# Patient Record
Sex: MALE | Race: BLACK OR AFRICAN AMERICAN | NOT HISPANIC OR LATINO | URBAN - METROPOLITAN AREA
[De-identification: names, ages, dates, MRNs, and addresses within clinical notes are randomized per-mention and may not be internally consistent; named-entity substitution may affect disease eponyms.]

---

## 2017-07-20 ENCOUNTER — EMERGENCY (EMERGENCY)
Facility: HOSPITAL | Age: 27
LOS: 1 days | Discharge: HOME | End: 2017-07-20
Attending: EMERGENCY MEDICINE | Admitting: EMERGENCY MEDICINE
Payer: COMMERCIAL

## 2017-07-20 VITALS
DIASTOLIC BLOOD PRESSURE: 75 MMHG | TEMPERATURE: 98 F | OXYGEN SATURATION: 98 % | HEART RATE: 73 BPM | RESPIRATION RATE: 18 BRPM | SYSTOLIC BLOOD PRESSURE: 110 MMHG

## 2017-07-20 VITALS
TEMPERATURE: 99 F | RESPIRATION RATE: 18 BRPM | HEART RATE: 83 BPM | OXYGEN SATURATION: 96 % | SYSTOLIC BLOOD PRESSURE: 118 MMHG | DIASTOLIC BLOOD PRESSURE: 77 MMHG

## 2017-07-20 DIAGNOSIS — Z20.2 CONTACT WITH AND (SUSPECTED) EXPOSURE TO INFECTIONS WITH A PREDOMINANTLY SEXUAL MODE OF TRANSMISSION: ICD-10-CM

## 2017-07-20 DIAGNOSIS — J02.9 ACUTE PHARYNGITIS, UNSPECIFIED: ICD-10-CM

## 2017-07-20 LAB — HIV 1 & 2 AB SERPL IA.RAPID: SIGNIFICANT CHANGE UP

## 2017-07-20 PROCEDURE — 99283 EMERGENCY DEPT VISIT LOW MDM: CPT

## 2017-07-20 NOTE — ED PROVIDER NOTE - PSYCHIATRIC, MLM
Alert and oriented to person, place, time/situation. normal mood and affect. Alert and oriented to person, place, time/situation. normal mood and affect. no apparent risk to self or others.

## 2017-07-20 NOTE — ED ADULT NURSE NOTE - CHPI ED SYMPTOMS NEG
no dizziness/no vomiting/no numbness/no nausea/no weakness/no decreased eating/drinking/no pain/no fever/no tingling/no chills

## 2017-07-20 NOTE — ED ADULT NURSE NOTE - OBJECTIVE STATEMENT
aox3 verbal, ambulatory well appearing states had unprotected sex x2weeks pta and requesting STI check specifically HIV. Denies any discharge, dysuria, pain to genitals.

## 2017-07-20 NOTE — ED PROVIDER NOTE - MEDICAL DECISION MAKING DETAILS
STD testing sent, HIV negative, pending remainder of results, currently asymptomatic, no indication for treatment currently

## 2017-07-21 LAB
C TRACH RRNA SPEC QL NAA+PROBE: SIGNIFICANT CHANGE UP
HBV SURFACE AB SER-ACNC: REACTIVE
HCV AB S/CO SERPL IA: 0.21 S/CO — SIGNIFICANT CHANGE UP
HCV AB SERPL-IMP: SIGNIFICANT CHANGE UP
SPECIMEN SOURCE: SIGNIFICANT CHANGE UP
T PALLIDUM AB TITR SER: NEGATIVE — SIGNIFICANT CHANGE UP

## 2017-08-10 ENCOUNTER — EMERGENCY (EMERGENCY)
Facility: HOSPITAL | Age: 27
LOS: 1 days | Discharge: HOME | End: 2017-08-10
Attending: EMERGENCY MEDICINE | Admitting: EMERGENCY MEDICINE
Payer: COMMERCIAL

## 2017-08-10 VITALS
DIASTOLIC BLOOD PRESSURE: 82 MMHG | HEART RATE: 77 BPM | SYSTOLIC BLOOD PRESSURE: 148 MMHG | TEMPERATURE: 99 F | OXYGEN SATURATION: 98 % | RESPIRATION RATE: 16 BRPM

## 2017-08-10 DIAGNOSIS — Z20.6 CONTACT WITH AND (SUSPECTED) EXPOSURE TO HUMAN IMMUNODEFICIENCY VIRUS [HIV]: ICD-10-CM

## 2017-08-10 LAB — HIV 1 & 2 AB SERPL IA.RAPID: SIGNIFICANT CHANGE UP

## 2017-08-10 PROCEDURE — 99284 EMERGENCY DEPT VISIT MOD MDM: CPT

## 2017-08-10 NOTE — ED PROVIDER NOTE - OBJECTIVE STATEMENT
28 yo M with no pertinent hx seen in the ED on July 20th for STD/STI check after having sexual intercourse with a new partner 2 weeks prior. Pt was asymptomatic at the time and denied having hx of syphilis, gonorrhea or chlamydia. Pt returns today for follow up repeat HIV testing. Pt is currently asymptomatic and has no new complaints.

## 2021-06-14 ENCOUNTER — EMERGENCY (EMERGENCY)
Facility: HOSPITAL | Age: 31
LOS: 1 days | Discharge: HOME | End: 2021-06-14
Attending: EMERGENCY MEDICINE | Admitting: EMERGENCY MEDICINE
Payer: SELF-PAY

## 2021-06-14 VITALS
TEMPERATURE: 98 F | DIASTOLIC BLOOD PRESSURE: 80 MMHG | WEIGHT: 212.97 LBS | HEART RATE: 84 BPM | OXYGEN SATURATION: 97 % | HEIGHT: 73 IN | SYSTOLIC BLOOD PRESSURE: 149 MMHG | RESPIRATION RATE: 16 BRPM

## 2021-06-14 VITALS
HEART RATE: 76 BPM | RESPIRATION RATE: 17 BRPM | SYSTOLIC BLOOD PRESSURE: 141 MMHG | TEMPERATURE: 98 F | OXYGEN SATURATION: 98 % | DIASTOLIC BLOOD PRESSURE: 74 MMHG

## 2021-06-14 DIAGNOSIS — Z11.4 ENCOUNTER FOR SCREENING FOR HUMAN IMMUNODEFICIENCY VIRUS [HIV]: ICD-10-CM

## 2021-06-14 DIAGNOSIS — Z20.822 CONTACT WITH AND (SUSPECTED) EXPOSURE TO COVID-19: ICD-10-CM

## 2021-06-14 DIAGNOSIS — R50.9 FEVER, UNSPECIFIED: ICD-10-CM

## 2021-06-14 LAB
FLUAV H1 2009 PAND RNA SPEC QL NAA+PROBE: SIGNIFICANT CHANGE UP
FLUAV H1 RNA SPEC QL NAA+PROBE: SIGNIFICANT CHANGE UP
FLUAV H3 RNA SPEC QL NAA+PROBE: SIGNIFICANT CHANGE UP
FLUAV SUBTYP SPEC NAA+PROBE: SIGNIFICANT CHANGE UP
FLUBV RNA SPEC QL NAA+PROBE: SIGNIFICANT CHANGE UP
HIV 1 & 2 AB SERPL IA.RAPID: SIGNIFICANT CHANGE UP
RAPID RVP RESULT: SIGNIFICANT CHANGE UP
SARS-COV-2 RNA SPEC QL NAA+PROBE: SIGNIFICANT CHANGE UP

## 2021-06-14 PROCEDURE — 99284 EMERGENCY DEPT VISIT MOD MDM: CPT

## 2021-06-14 NOTE — ED PROVIDER NOTE - PATIENT PORTAL LINK FT
You can access the FollowMyHealth Patient Portal offered by [facility] by registering at the following website: [email or web address]. By joining [employer] [facility] [employer]’s FollowMyHealth portal, you will also be able to view your health information using other applications (apps) compatible with our system.

## 2021-06-14 NOTE — ED PROVIDER NOTE - PHYSICAL EXAMINATION
Constitutional:  Well appearing, awake, alert, oriented to person, place, time/situation and in no apparent distress.  HEENT: Airway patent, Nasal mucosa clear. Mouth with normal mucosa. Throat has no vesicles, no oropharyngeal exudates and uvula is midline.  Eyes: Clear bilaterally, pupils equal, round and reactive to light.  Cardiac: Normal rate, regular rhythm.  Respiratory: Breath sounds clear and equal bilaterally.  GI: Abdomen soft, non-tender, no guarding.  : nml appearing penis and scrotum, no penile lesions, no epididymal tenderness or swelling  MSK: Spine appears normal, range of motion is not limited, no muscle or joint tenderness  Neuro: Alert and oriented, no focal deficits, no motor or sensory deficits.  Skin: Skin normal color for race, warm, dry and intact. No evidence of rash.

## 2021-06-14 NOTE — ED PROVIDER NOTE - OBJECTIVE STATEMENT
30yo M no pmh presents with request for HIV test.  Pt states he had unprotected vaginal intercourse with woman he did not know approx 1 mo ago and is concerned about exposure to STI's, particularly HIV.  No penile dc or lesions.  Pt states over the past few days he has had subjective fever and chills, diffuse body aches, mild cough.  had negative COVID test 2d ago.

## 2021-06-14 NOTE — ED PROVIDER NOTE - NS ED ROS FT
Constitutional:  +fever, +chills, No night sweats  Eyes:  No visual changes, No discharge, No redness  ENMT:  No epistaxis, no nasal congestion, no throat pain, no difficulty swallowing  CV:  No chest pain, No palpitations, No peripheral edema  Resp:  +cough, No shortness of breath  GI:  No abdominal pain, No vomiting, No diarrhea  MSK:  No neck pain or stiffness, No joint swelling or pain, No back pain  Neuro: no loss of consciousness, no gait abnormality, no headache, no sensory deficits, and no weakness.  Skin:  No abrasions, no lesions, no rashes  Psych:  No known mental health issues

## 2021-06-14 NOTE — ED PROVIDER NOTE - CLINICAL SUMMARY MEDICAL DECISION MAKING FREE TEXT BOX
30yo M no pmh presents with request for HIV test after unprotected intercourse 1mo ago as well as recent subjective fevers, chills, and bodyaches.  On exam afebrile, VSS, well appearing, lungs CTAB.   exam unremarkable.  Will send STI panel per pt preference, check COVID and viral URI panel to eval for viral syndrome-like symptoms. 32yo M no pmh presents with request for HIV test after unprotected intercourse 1mo ago as well as recent subjective fevers, chills, and bodyaches.  On exam afebrile, VSS, well appearing, lungs CTAB.   exam unremarkable.  Will send STI panel per pt preference, check COVID and viral URI panel to eval for viral syndrome-like symptoms.    HIV negative.  UA with trace blood.  No symptoms to suggest kidney stone.  Discussed with pt.  Will follow up with his PMD for repeat.

## 2021-06-14 NOTE — ED ADULT NURSE NOTE - CHIEF COMPLAINT QUOTE
requesting STI testing, muscle pain and aches-concern for HIV after having unprotected sex 4 weeks ago

## 2021-06-14 NOTE — ED ADULT NURSE NOTE - NSIMPLEMENTINTERV_GEN_ALL_ED
Implemented All Universal Safety Interventions:  David to call system. Call bell, personal items and telephone within reach. Instruct patient to call for assistance. Room bathroom lighting operational. Non-slip footwear when patient is off stretcher. Physically safe environment: no spills, clutter or unnecessary equipment. Stretcher in lowest position, wheels locked, appropriate side rails in place.

## 2021-06-14 NOTE — ED PROVIDER NOTE - NSFOLLOWUPINSTRUCTIONS_ED_ALL_ED_FT
Preventing Sexually Transmitted Infections, Adult      Sexually transmitted infections (STIs) are diseases that are spread from person to person (are contagious). They are spread, or transmitted, through bodily fluids exchanged during sex or sexual contact. These bodily fluids include saliva, semen, blood, vaginal mucus, and urine. STIs are very common among people of all ages.  Some common STIs include:  •Herpes.      •Hepatitis B.      •Chlamydia.      •Gonorrhea.      •Syphilis.      •HPV (human papillomavirus).      •HIV, also called the human immunodeficiency virus. This is the virus that can cause AIDS (acquired immunodeficiency syndrome).      Often, people who have these STIs do not have symptoms. Even without symptoms, these infections can be spread from person to person and require treatment.      How can these conditions affect me?    STIs can be treated, and many STIs can be cured. However, some STIs cannot be cured and will affect you for the rest of your life.  Certain STIs may:  •Require you to take medicine for the rest of your life.      •Affect your ability to have children (your fertility).    •Increase your risk for developing another STI or certain serious health conditions. These may include:  •Cervical cancer.      •Head and neck cancer.      •Pelvic inflammatory disease (PID), in women.      •Organ damage or damage to other parts of your body, if the infection spreads.        •Cause problems during pregnancy and may be transmitted to the baby during the pregnancy or childbirth.        What can increase my risk?  You may have an increased risk for developing an STI if:   •You have unprotected sex. Sex includes oral, vaginal, or anal sex.      •You have more than one sex partner.      •You have a sex partner who has multiple sex partners.      •You have sex with someone who has an STI.      •You have an STI or you had an STI before.      •You inject drugs or have a sex partner or partners who inject drugs.        What actions can I take to prevent STIs?    The only way to completely prevent STIs is not to have sex of any kind. This is called practicing abstinence. If you are sexually active, you can protect yourself and others by taking these actions to lower your risk of getting an STI:    Lifestyle     Avoid mixing alcohol, drugs, and sex. Alcohol and drug use can affect your ability to make good decisions and can lead to risky sexual behaviors.    Medicines     Ask your health care provider about taking pre-exposure prophylaxis (PrEP) to prevent HIV infection.      General information    •Stay up to date on vaccinations. Certain vaccines can lower your risk of getting certain STIs, such as:  •Hepatitis A and hepatitis B vaccines. You may have been vaccinated as a young child, but you will likely need a booster shot as a teen or young adult.      •HPV (human papillomavirus) vaccine.        •Have only one sex partner (be monogamous) or limit the number of sex partners you have.    •Use methods that prevent the exchange of body fluids between partners (barrier protection) correctly every time you have sex. Barrier protection can be used during oral, vaginal, or anal sex. Commonly used barrier methods include:  •Male condom.       •Female condom.       •Dental dam.        •Use a new condom for every sex act from start to finish.      •Get tested for STIs. Have your partners get tested, too.      •If you test positive for an STI, follow recommendations from your health care provider about treatment and make sure your sex partners are tested and treated.      •Birth control pills, injections, implants, and intrauterine devices (IUDs) do not protect against STIs. To prevent both STIs and pregnancy, always use a condom with another form of birth control.      •Some STIs, such as herpes, are spread through skin-to-skin contact. A condom may not protect you from getting such STIs. Avoid all sexual contact if you or your partners have herpes and there is an active flare with open sores.        Where to find more information  Learn more about STIs from:•Centers for Disease Control and Prevention:  •More information about specific STIs: cdc.gov/std      •Places to get sexual health counseling and treatment for free or at a low cost: gettested.cdc.gov        •[place] Department of Health and Human Services: www.womenshealth.gov        Summary    •Sexually transmitted infections (STIs) can spread through exchanging bodily fluids during sexual contact. Fluids include saliva, semen, blood, vaginal mucus, and urine.      •You may have an increased risk for developing an STI if you have unprotected sex. Sex includes oral, vaginal, or anal sex.      •If you do have sex, limit your number of sex partners and use barrier protection every time you have sex.      This information is not intended to replace advice given to you by your health care provider. Make sure you discuss any questions you have with your health care provider.

## 2021-06-15 LAB
C TRACH RRNA SPEC QL NAA+PROBE: SIGNIFICANT CHANGE UP
N GONORRHOEA RRNA SPEC QL NAA+PROBE: SIGNIFICANT CHANGE UP
SPECIMEN SOURCE: SIGNIFICANT CHANGE UP

## 2021-06-16 LAB — T PALLIDUM AB TITR SER: NEGATIVE — SIGNIFICANT CHANGE UP

## 2021-06-28 NOTE — ED ADULT TRIAGE NOTE - CHIEF COMPLAINT QUOTE
Spinal Block    Start time: 6/28/2021 7:10 AM  End time: 6/28/2021 7:18 AM  Performed by: Lissette Aguirre CRNA  Authorized by: Ho Sorto MD     Pre-procedure:   Indications: at surgeon's request, post-op pain management, procedure for pain and primary anesthetic  Preanesthetic Checklist: patient identified, risks and benefits discussed, anesthesia consent, site marked, patient being monitored and timeout performed    Timeout Time: 07:13 EDT          Spinal Block:   Patient Position:  Seated  Prep Region:  Lumbar  Prep: Betadine      Location:  L3-4  Technique:  Single shot    Local Dose (mL):  1.6    Needle:   Needle Type:  Pencan  Needle Gauge:  25 G  Attempts:  1      Events: CSF confirmed, no blood with aspiration and no paresthesia        Assessment:  Insertion:  Uncomplicated  Patient tolerance:  Patient tolerated the procedure well with no immediate complications requesting STI testing, muscle pain and aches-concern for HIV after having unprotected sex 4 weeks ago

## 2023-02-21 NOTE — ED ADULT NURSE NOTE - NSFALLRSKOUTCOME_ED_ALL_ED
Morphology Per Location (Optional): Brown papule
Detail Level: Detailed
Size Of Lesion: 0.4 cm
complains of pain/discomfort
Universal Safety Interventions